# Patient Record
Sex: FEMALE | Race: OTHER | NOT HISPANIC OR LATINO | URBAN - METROPOLITAN AREA
[De-identification: names, ages, dates, MRNs, and addresses within clinical notes are randomized per-mention and may not be internally consistent; named-entity substitution may affect disease eponyms.]

---

## 2017-12-19 ENCOUNTER — EMERGENCY (EMERGENCY)
Facility: HOSPITAL | Age: 32
LOS: 1 days | Discharge: HOME | End: 2017-12-19
Attending: EMERGENCY MEDICINE | Admitting: EMERGENCY MEDICINE
Payer: SELF-PAY

## 2017-12-19 VITALS
SYSTOLIC BLOOD PRESSURE: 112 MMHG | DIASTOLIC BLOOD PRESSURE: 78 MMHG | TEMPERATURE: 99 F | HEART RATE: 84 BPM | RESPIRATION RATE: 16 BRPM | OXYGEN SATURATION: 100 %

## 2017-12-19 VITALS
SYSTOLIC BLOOD PRESSURE: 122 MMHG | OXYGEN SATURATION: 99 % | TEMPERATURE: 98 F | HEART RATE: 109 BPM | DIASTOLIC BLOOD PRESSURE: 83 MMHG | RESPIRATION RATE: 16 BRPM

## 2017-12-19 DIAGNOSIS — F07.81 POSTCONCUSSIONAL SYNDROME: ICD-10-CM

## 2017-12-19 DIAGNOSIS — R55 SYNCOPE AND COLLAPSE: ICD-10-CM

## 2017-12-19 DIAGNOSIS — Z88.6 ALLERGY STATUS TO ANALGESIC AGENT: ICD-10-CM

## 2017-12-19 PROCEDURE — 93005 ELECTROCARDIOGRAM TRACING: CPT

## 2017-12-19 PROCEDURE — 99284 EMERGENCY DEPT VISIT MOD MDM: CPT | Mod: 25

## 2017-12-19 PROCEDURE — 70450 CT HEAD/BRAIN W/O DYE: CPT | Mod: 26

## 2017-12-19 PROCEDURE — 82962 GLUCOSE BLOOD TEST: CPT

## 2017-12-19 PROCEDURE — 93010 ELECTROCARDIOGRAM REPORT: CPT

## 2017-12-19 PROCEDURE — 70450 CT HEAD/BRAIN W/O DYE: CPT

## 2017-12-19 NOTE — ED ADULT NURSE NOTE - OBJECTIVE STATEMENT
presents to ED c/o of dizziness, nausea nad headache after passing out on saturday. pt reports losing consciousness on saturday.  c/o of headache and bump the back of head. also since the episode states she has been feeling dizzy with nausea.  denies any neck and back pain, cp and shortness of breath.

## 2017-12-19 NOTE — ED PROVIDER NOTE - MUSCULOSKELETAL, MLM
Spine appears normal, range of motion is not limited, no muscle or joint tenderness, neck/back nontender

## 2017-12-19 NOTE — ED PROVIDER NOTE - CPE EDP MUSC NORM
Post-Care Instructions: I reviewed with the patient in detail post-care instructions. Patient is to wear sunprotection, and avoid picking at any of the treated lesions. Pt may apply Vaseline to crusted or scabbing areas.
Duration Of Freeze Thaw-Cycle (Seconds): 4
Consent: The patient's consent was obtained including but not limited to risks of crusting, scabbing, blistering, scarring, darker or lighter pigmentary change, recurrence, incomplete removal and infection.
Number Of Freeze-Thaw Cycles: 1 freeze-thaw cycle
Render Post-Care Instructions In Note?: yes
Detail Level: Detailed
normal...

## 2017-12-19 NOTE — ED ADULT TRIAGE NOTE - ARRIVAL INFO ADDITIONAL COMMENTS
Unwitnessed fall. Denies any CP, SOB, visual changes, numbness or tingling to extremities. Unwitnessed fall. Denies any CP, SOB, visual changes, numbness or tingling to extremities, use of blood thinners.

## 2017-12-19 NOTE — ED ADULT TRIAGE NOTE - CHIEF COMPLAINT QUOTE
"On Sunday night I woke up to blow my nose and I felt dizzy and I was on the floor shaking. I also have pressure in my head and nausea. "

## 2017-12-19 NOTE — ED PROVIDER NOTE - OBJECTIVE STATEMENT
31 yo female h/o MS on interferon c/o ha, dizziness, n after hitting head when she syncopized yest am.  Pt states she was blowing her nose, noted epistaxis, felt dizzy and then passed out.  + h/o syncope x 1 in th past.  No preceding cp, palpitations, sob, ha, change in vision/speech/gait, numbness or weakness in ext.  Pt noted slight shaking while she was on the floor after passing out, no h/o sz.  Pt hit head when she fell and notes pain on side of head she struck as well as dizziness, n, ha.  + relief w otc meds.

## 2017-12-19 NOTE — ED PROVIDER NOTE - ENMT, MLM
Airway patent, Nasal mucosa clear. Mouth with normal mucosa. Throat has no vesicles, no oropharyngeal exudates and uvula is midline. mild ttp L head w hematoma

## 2017-12-19 NOTE — ED PROVIDER NOTE - MEDICAL DECISION MAKING DETAILS
Pt w epistaxis and syncope after blowing nose yest now c/o ha suggstive of post concussive syndrome.  Neuro exam, ekg, fs wnl; hcg neg.  Will get ct head, likely dc to [facility] w pmd, [place].

## 2023-05-02 NOTE — ED PROVIDER NOTE - NSTIMEPROVIDERCAREINITIATE_GEN_ER
CC:  Migue Encarnacion is here today for Office Visit (Establish care / L thumb partially numb )    Pt c/o heat rash on L arm      Medications: medications verified, no change  Added preferred pharmacy  Refills needed today?  NO    denies Latex allergy or sensitivity    Health Maintenance Due   Topic Date Due   • Depression Screening  03/30/2023       Patient is up to date, no discussion needed.    Recent PHQ 2/9 Score    PHQ 2:  Date Adult PHQ 2 Score Adult PHQ 2 Interpretation   5/2/2023 0 No further screening needed       PHQ 9:         Patient would like communication of their results via:      [employer]    Cell Phone:   Telephone Information:   Mobile [phone]     Okay to leave a message containing results? Yes     19-Dec-2017 12:00